# Patient Record
Sex: MALE | Race: WHITE | Employment: OTHER | URBAN - METROPOLITAN AREA
[De-identification: names, ages, dates, MRNs, and addresses within clinical notes are randomized per-mention and may not be internally consistent; named-entity substitution may affect disease eponyms.]

---

## 2017-02-07 RX ORDER — HYDROCODONE BITARTRATE AND ACETAMINOPHEN 5; 325 MG/1; MG/1
1 TABLET ORAL EVERY 6 HOURS PRN
Qty: 120 TABLET | Refills: 0 | Status: SHIPPED | OUTPATIENT
Start: 2017-02-07 | End: 2017-03-15

## 2017-02-10 ENCOUNTER — HOSPITAL ENCOUNTER (OUTPATIENT)
Dept: PAIN MANAGEMENT | Age: 67
Discharge: HOME | End: 2017-02-10
Payer: OTHER GOVERNMENT

## 2017-03-09 ENCOUNTER — HOSPITAL ENCOUNTER (OUTPATIENT)
Dept: PAIN MANAGEMENT | Age: 67
Discharge: HOME | End: 2017-03-09
Payer: COMMERCIAL

## 2017-03-15 ENCOUNTER — HOSPITAL ENCOUNTER (OUTPATIENT)
Dept: CARDIAC CATH/INVASIVE PROCEDURES | Age: 67
Discharge: HOME | End: 2017-03-15
Payer: COMMERCIAL

## 2017-03-15 VITALS
TEMPERATURE: 97.7 F | RESPIRATION RATE: 25 BRPM | OXYGEN SATURATION: 97 % | SYSTOLIC BLOOD PRESSURE: 107 MMHG | DIASTOLIC BLOOD PRESSURE: 64 MMHG | HEART RATE: 70 BPM | WEIGHT: 240 LBS | BODY MASS INDEX: 30.8 KG/M2 | HEIGHT: 74 IN

## 2017-03-15 DIAGNOSIS — I25.5 ISCHEMIC CARDIOMYOPATHY: Chronic | ICD-10-CM

## 2017-03-15 DIAGNOSIS — Z45.02 ICD (IMPLANTABLE CARDIOVERTER-DEFIBRILLATOR) BATTERY DEPLETION: ICD-10-CM

## 2017-03-15 LAB
GLUCOSE BLD-MCNC: 109 MG/DL (ref 74–106)
PLATELET # BLD: 213 K/UL (ref 140–450)
POC BUN: 21 MG/DL (ref 6–20)
POC CHLORIDE: 103 MMOL/L (ref 98–110)
POC CREATININE: 0.9 MG/DL (ref 0.6–1.4)
POC HEMATOCRIT: 46 % (ref 41–53)
POC HEMOGLOBIN: 15.6 GM/DL (ref 13.5–17.5)
POC POTASSIUM: 4.4 MMOL/L (ref 3.5–5.1)
POC SODIUM: 142 MMOL/L (ref 136–145)

## 2017-03-15 PROCEDURE — 6370000000 HC RX 637 (ALT 250 FOR IP)

## 2017-03-15 PROCEDURE — C1882 AICD, OTHER THAN SING/DUAL: HCPCS

## 2017-03-15 PROCEDURE — 2580000003 HC RX 258

## 2017-03-15 PROCEDURE — 7100000010 HC PHASE II RECOVERY - FIRST 15 MIN

## 2017-03-15 PROCEDURE — 93005 ELECTROCARDIOGRAM TRACING: CPT

## 2017-03-15 PROCEDURE — 85049 AUTOMATED PLATELET COUNT: CPT

## 2017-03-15 PROCEDURE — 6360000002 HC RX W HCPCS

## 2017-03-15 PROCEDURE — 82565 ASSAY OF CREATININE: CPT

## 2017-03-15 PROCEDURE — 82947 ASSAY GLUCOSE BLOOD QUANT: CPT

## 2017-03-15 PROCEDURE — 82435 ASSAY OF BLOOD CHLORIDE: CPT

## 2017-03-15 PROCEDURE — 33264 RMVL & RPLCMT DFB GEN MLT LD: CPT | Performed by: INTERNAL MEDICINE

## 2017-03-15 PROCEDURE — 7100000011 HC PHASE II RECOVERY - ADDTL 15 MIN

## 2017-03-15 PROCEDURE — 84520 ASSAY OF UREA NITROGEN: CPT

## 2017-03-15 PROCEDURE — 2500000003 HC RX 250 WO HCPCS

## 2017-03-15 PROCEDURE — 85014 HEMATOCRIT: CPT

## 2017-03-15 PROCEDURE — 84295 ASSAY OF SERUM SODIUM: CPT

## 2017-03-15 PROCEDURE — 84132 ASSAY OF SERUM POTASSIUM: CPT

## 2017-03-15 RX ORDER — HYDROCODONE BITARTRATE AND ACETAMINOPHEN 5; 325 MG/1; MG/1
2 TABLET ORAL EVERY 4 HOURS PRN
Status: DISCONTINUED | OUTPATIENT
Start: 2017-03-15 | End: 2017-03-16 | Stop reason: HOSPADM

## 2017-03-15 RX ORDER — ACETAMINOPHEN 325 MG/1
650 TABLET ORAL EVERY 4 HOURS PRN
Status: DISCONTINUED | OUTPATIENT
Start: 2017-03-15 | End: 2017-03-16 | Stop reason: HOSPADM

## 2017-03-15 RX ORDER — ONDANSETRON 2 MG/ML
4 INJECTION INTRAMUSCULAR; INTRAVENOUS EVERY 6 HOURS PRN
Status: DISCONTINUED | OUTPATIENT
Start: 2017-03-15 | End: 2017-03-16 | Stop reason: HOSPADM

## 2017-03-15 RX ORDER — SODIUM CHLORIDE 0.9 % (FLUSH) 0.9 %
10 SYRINGE (ML) INJECTION EVERY 12 HOURS SCHEDULED
Status: DISCONTINUED | OUTPATIENT
Start: 2017-03-15 | End: 2017-03-16 | Stop reason: HOSPADM

## 2017-03-15 RX ORDER — SODIUM CHLORIDE 0.9 % (FLUSH) 0.9 %
10 SYRINGE (ML) INJECTION PRN
Status: DISCONTINUED | OUTPATIENT
Start: 2017-03-15 | End: 2017-03-16 | Stop reason: HOSPADM

## 2017-03-15 RX ORDER — SODIUM CHLORIDE 9 MG/ML
INJECTION, SOLUTION INTRAVENOUS CONTINUOUS
Status: DISCONTINUED | OUTPATIENT
Start: 2017-03-15 | End: 2017-03-16 | Stop reason: HOSPADM

## 2017-03-15 RX ORDER — NORTRIPTYLINE HYDROCHLORIDE 10 MG/1
20 CAPSULE ORAL NIGHTLY
COMMUNITY

## 2017-03-15 RX ADMIN — SODIUM CHLORIDE: 9 INJECTION, SOLUTION INTRAVENOUS at 10:04

## 2017-03-16 LAB
EKG ATRIAL RATE: 70 BPM
EKG P-R INTERVAL: 120 MS
EKG Q-T INTERVAL: 482 MS
EKG QRS DURATION: 164 MS
EKG QTC CALCULATION (BAZETT): 520 MS
EKG R AXIS: -106 DEGREES
EKG T AXIS: -172 DEGREES
EKG VENTRICULAR RATE: 70 BPM

## 2017-06-20 ENCOUNTER — TELEPHONE (OUTPATIENT)
Dept: PODIATRY | Age: 67
End: 2017-06-20

## 2017-06-21 RX ORDER — TERBINAFINE HYDROCHLORIDE 250 MG/1
250 TABLET ORAL DAILY
Qty: 90 TABLET | Refills: 0 | Status: SHIPPED | OUTPATIENT
Start: 2017-06-21 | End: 2017-08-02

## 2017-06-21 RX ORDER — OXICONAZOLE NITRATE 10 MG/G
1 CREAM TOPICAL 2 TIMES DAILY
Qty: 90 G | Refills: 5 | Status: SHIPPED | OUTPATIENT
Start: 2017-06-21

## 2017-08-28 ENCOUNTER — TELEPHONE (OUTPATIENT)
Dept: ORTHOPEDIC SURGERY | Age: 67
End: 2017-08-28

## 2017-08-29 ENCOUNTER — OFFICE VISIT (OUTPATIENT)
Dept: ORTHOPEDIC SURGERY | Age: 67
End: 2017-08-29
Payer: COMMERCIAL

## 2017-08-29 DIAGNOSIS — M25.569 ACUTE KNEE PAIN, UNSPECIFIED LATERALITY: ICD-10-CM

## 2017-08-29 DIAGNOSIS — Z96.653 STATUS POST TOTAL BILATERAL KNEE REPLACEMENT: ICD-10-CM

## 2017-08-29 DIAGNOSIS — M17.10 ARTHRITIS OF KNEE: Primary | ICD-10-CM

## 2017-08-29 PROCEDURE — 99213 OFFICE O/P EST LOW 20 MIN: CPT | Performed by: ORTHOPAEDIC SURGERY

## 2017-08-29 PROCEDURE — 4004F PT TOBACCO SCREEN RCVD TLK: CPT | Performed by: ORTHOPAEDIC SURGERY

## 2017-08-29 PROCEDURE — 3017F COLORECTAL CA SCREEN DOC REV: CPT | Performed by: ORTHOPAEDIC SURGERY

## 2017-08-29 PROCEDURE — G8419 CALC BMI OUT NRM PARAM NOF/U: HCPCS | Performed by: ORTHOPAEDIC SURGERY

## 2017-08-29 PROCEDURE — G8598 ASA/ANTIPLAT THER USED: HCPCS | Performed by: ORTHOPAEDIC SURGERY

## 2017-08-29 PROCEDURE — G8428 CUR MEDS NOT DOCUMENT: HCPCS | Performed by: ORTHOPAEDIC SURGERY

## 2017-08-29 PROCEDURE — 1123F ACP DISCUSS/DSCN MKR DOCD: CPT | Performed by: ORTHOPAEDIC SURGERY

## 2017-08-29 PROCEDURE — 4040F PNEUMOC VAC/ADMIN/RCVD: CPT | Performed by: ORTHOPAEDIC SURGERY

## 2017-08-29 RX ORDER — SULFAMETHOXAZOLE AND TRIMETHOPRIM 800; 160 MG/1; MG/1
1 TABLET ORAL 2 TIMES DAILY
Qty: 20 TABLET | Refills: 0 | Status: SHIPPED | OUTPATIENT
Start: 2017-08-29 | End: 2017-09-08

## 2017-09-19 ENCOUNTER — OFFICE VISIT (OUTPATIENT)
Dept: ORTHOPEDIC SURGERY | Age: 67
End: 2017-09-19
Payer: COMMERCIAL

## 2017-09-19 DIAGNOSIS — M25.569 ACUTE KNEE PAIN, UNSPECIFIED LATERALITY: ICD-10-CM

## 2017-09-19 DIAGNOSIS — Z96.653 STATUS POST TOTAL BILATERAL KNEE REPLACEMENT: ICD-10-CM

## 2017-09-19 DIAGNOSIS — M17.10 ARTHRITIS OF KNEE: Primary | ICD-10-CM

## 2017-09-19 PROCEDURE — 99213 OFFICE O/P EST LOW 20 MIN: CPT | Performed by: ORTHOPAEDIC SURGERY

## 2017-09-19 PROCEDURE — 4004F PT TOBACCO SCREEN RCVD TLK: CPT | Performed by: ORTHOPAEDIC SURGERY

## 2017-09-19 PROCEDURE — G8421 BMI NOT CALCULATED: HCPCS | Performed by: ORTHOPAEDIC SURGERY

## 2017-09-19 PROCEDURE — G8598 ASA/ANTIPLAT THER USED: HCPCS | Performed by: ORTHOPAEDIC SURGERY

## 2017-09-19 PROCEDURE — 1123F ACP DISCUSS/DSCN MKR DOCD: CPT | Performed by: ORTHOPAEDIC SURGERY

## 2017-09-19 PROCEDURE — G8427 DOCREV CUR MEDS BY ELIG CLIN: HCPCS | Performed by: ORTHOPAEDIC SURGERY

## 2017-09-19 PROCEDURE — 3017F COLORECTAL CA SCREEN DOC REV: CPT | Performed by: ORTHOPAEDIC SURGERY

## 2017-09-19 PROCEDURE — 4040F PNEUMOC VAC/ADMIN/RCVD: CPT | Performed by: ORTHOPAEDIC SURGERY

## 2020-01-07 ENCOUNTER — OFFICE VISIT (OUTPATIENT)
Dept: ORTHOPEDIC SURGERY | Age: 70
End: 2020-01-07

## 2020-01-07 ENCOUNTER — TELEPHONE (OUTPATIENT)
Dept: ORTHOPEDIC SURGERY | Age: 70
End: 2020-01-07

## 2020-01-07 VITALS — BODY MASS INDEX: 30.8 KG/M2 | HEIGHT: 74 IN | WEIGHT: 240 LBS

## 2020-01-07 PROBLEM — M75.121 COMPLETE TEAR OF RIGHT ROTATOR CUFF: Status: ACTIVE | Noted: 2020-01-07

## 2020-01-07 PROCEDURE — 99203 OFFICE O/P NEW LOW 30 MIN: CPT | Performed by: ORTHOPAEDIC SURGERY

## 2020-01-07 NOTE — PROGRESS NOTES
includes Cervical spine surgery; AAA repair, endovascular (Left); vascular surgery; back surgery; Colonoscopy; Coronary angioplasty with stent (10/2007); Cardiac defibrillator placement (Left, 07/2015); pacemaker placement (02/2008); Knee arthroscopy (Left, 07/14/14); Knee Arthroplasty (Bilateral, 3/16/15); joint replacement (Bilateral, 03/2015); Cardiac defibrillator placement (03/15/2017); and Vasectomy. Current Medications  Current Outpatient Medications   Medication Sig Dispense Refill    oxiconazole nitrate (OXISTAT) 1 % CREA cream Apply 1 drop topically 2 times daily 90 g 5    Cholecalciferol (VITAMIN D PO) Take 3,000 Units by mouth daily      nortriptyline (PAMELOR) 10 MG capsule Take 20 mg by mouth nightly      NONFORMULARY Take 2 capsules by mouth daily OMEGA XL      GARCINIA CAMBOGIA-CHROMIUM PO Take 3 tablets by mouth daily      metoprolol (LOPRESSOR) 25 MG tablet Take 12.5 mg by mouth 2 times daily       clopidogrel (PLAVIX) 75 MG tablet       aspirin 81 MG chewable tablet Take 81 mg by mouth daily.  atorvastatin (LIPITOR) 80 MG tablet Take 80 mg by mouth daily. No current facility-administered medications for this visit. Allergies  Allergies have been reviewed. Calvin Nguyen has No Known Allergies. Social History  Calvin Nguyen  reports that he has quit smoking. His smoking use included cigarettes. He has a 11.25 pack-year smoking history. He uses smokeless tobacco. He reports current alcohol use. He reports that he does not use drugs. Family History  Viktor's family history includes Heart Disease in his father.      Physical Exam:     Ht 6' 2\" (1.88 m)   Wt 240 lb (108.9 kg)   BMI 30.81 kg/m²    General Appearance: alert, well appearing, and in no distress  Mental Status: alert, oriented to person, place, and time  Gait: normal    Shoulder:    Skin: warm and dry, no rash or erythema; no swelling or obvious muscular atrophy  Vasculature: 2+ radial pulses bilaterally  Neuro: Sensation glenohumeral joint space. Moderate acromioclavicular joint space narrowing. Type I acromion. No obvious fracture, dislocation, or subluxation. Right shoulder radiograph with moderate degenerative changes involving the acromioclavicular joint. Impression: Left shoulder radiograph with moderate degenerative changes involving the acromioclavicular joint. CT arthrogram of the right shoulder completed on 12/11/2019 was made available for review demonstrating what appears to be a full-thickness tear of the supraspinatus and possibly the infraspinatus with retraction to the mid humeral head. Impression/Plan:     Silas Bhakta is a 68 y.o. old male with bilateral shoulder pain. It appears that he has findings on the right side including imaging consistent with a full-thickness rotator cuff tear. He is undergone treatment with therapy as well as cortisone injections in the past.  At this time we discussed treatment options including continued conservative management in addition to surgical intervention by way of an arthroscopic rotator cuff repair. He is inclined to proceed with surgery consequently we discussed in detail what surgery would entail in terms of the actual procedure, risks and benefits of surgery, expected outcome and postoperative recovery course. Risks as discussed included but were not limited to risk of infection, wound healing problems, stiffness, progressive arthritis, persistent pain, re-tear of the rotator cuff, failure of the rotator cuff repair to heal, suture and/or anchor related problems, vascular injury, excessive bleeding, temporary and/or permanent nerve injury, medical risks including DVT, PE, and stroke, and risk of anesthesia. He demonstrated a good understanding of our discussion and at this time would like to proceed with surgical intervention as outlined above.   We will facilitate him getting cleared for surgery preoperatively and we'll schedule him for surgery at his convenience. All questions were appropriately answered however he was encouraged to return or call prior to surgery or any other upcoming appointments with additional questions and/or concerns. With regards to the left shoulder I believe him to have a rotator cuff and biceps tendinitis. We discussed treatment for this and I did recommend proceeding conservatively at this time. A course of physical therapy and a cortisone injection versus prescription anti-inflammatory would be recommended.   No treatment initiated at this time pending the right shoulder being taken care of.          NA = Not assessed  RTC = Rotator cuff  RCT = Rotator cuff tear  ER = External rotation  IR = Internal rotation  AC = Acromioclavicular  GH = Glenohumeral  n = No  y = Yes

## 2020-01-09 NOTE — TELEPHONE ENCOUNTER
I called the pricing line spoke with Chacorta Chopra and she stated she can not help me, I need to call speak with [facility] registration, she transferred me, the person whom answered the phone stated she was not able to assist me either since I do not have the patient's Medicare number, she told me to call the pricing line. I let her know I already did and they transferred me to her. I called back the pricing line and spoke again with Chacorta Chopra and she again stated she is not able to assist me, she stated let me check with someone else and then she hung up on me. I'm giving up for the evening, I will try again when I return on Tuesday.

## 2020-01-15 NOTE — TELEPHONE ENCOUNTER
Called the pricing line again today and spoke with Chacorta Chopra again, she was only able to give me a Self Pay estimate due to we do not have the patient's Medicare number. It's [id number]. I called [facility] and left a message for Keegan Chakraborty to call me back with an estimate cost for the anesthesia.

## 2020-02-12 ENCOUNTER — HOSPITAL ENCOUNTER (OUTPATIENT)
Dept: PREADMISSION TESTING | Age: 70
Discharge: HOME | End: 2020-02-16
Payer: OTHER GOVERNMENT

## 2020-02-12 VITALS
DIASTOLIC BLOOD PRESSURE: 65 MMHG | WEIGHT: 240 LBS | SYSTOLIC BLOOD PRESSURE: 115 MMHG | OXYGEN SATURATION: 99 % | HEART RATE: 76 BPM | RESPIRATION RATE: 16 BRPM | BODY MASS INDEX: 30.8 KG/M2 | TEMPERATURE: 97.7 F | HEIGHT: 74 IN

## 2020-02-12 LAB
ABSOLUTE EOS #: 0.2 K/UL (ref 0–0.4)
ABSOLUTE IMMATURE GRANULOCYTE: ABNORMAL K/UL (ref 0–0.3)
ABSOLUTE LYMPH #: 0.8 K/UL (ref 1–4.8)
ABSOLUTE MONO #: 0.3 K/UL (ref 0.1–1.3)
ANION GAP SERPL CALCULATED.3IONS-SCNC: 12 MMOL/L (ref 9–17)
BASOPHILS # BLD: 1 % (ref 0–2)
BASOPHILS ABSOLUTE: 0 K/UL (ref 0–0.2)
BUN BLDV-MCNC: 18 MG/DL (ref 8–23)
BUN/CREAT BLD: ABNORMAL (ref 9–20)
CALCIUM SERPL-MCNC: 9.1 MG/DL (ref 8.6–10.4)
CHLORIDE BLD-SCNC: 101 MMOL/L (ref 98–107)
CO2: 23 MMOL/L (ref 20–31)
CREAT SERPL-MCNC: 1.14 MG/DL (ref 0.7–1.2)
DIFFERENTIAL TYPE: ABNORMAL
EOSINOPHILS RELATIVE PERCENT: 4 % (ref 0–4)
GFR AFRICAN AMERICAN: >60 ML/MIN
GFR NON-AFRICAN AMERICAN: >60 ML/MIN
GFR SERPL CREATININE-BSD FRML MDRD: ABNORMAL ML/MIN/{1.73_M2}
GFR SERPL CREATININE-BSD FRML MDRD: ABNORMAL ML/MIN/{1.73_M2}
GLUCOSE BLD-MCNC: 145 MG/DL (ref 70–99)
HCT VFR BLD CALC: 40.1 % (ref 41–53)
HEMOGLOBIN: 13.6 G/DL (ref 13.5–17.5)
IMMATURE GRANULOCYTES: ABNORMAL %
LYMPHOCYTES # BLD: 17 % (ref 24–44)
MCH RBC QN AUTO: 33 PG (ref 26–34)
MCHC RBC AUTO-ENTMCNC: 33.8 G/DL (ref 31–37)
MCV RBC AUTO: 97.6 FL (ref 80–100)
MONOCYTES # BLD: 7 % (ref 1–7)
NRBC AUTOMATED: ABNORMAL PER 100 WBC
PDW BLD-RTO: 13 % (ref 11.5–14.9)
PLATELET # BLD: 171 K/UL (ref 150–450)
PLATELET ESTIMATE: ABNORMAL
PMV BLD AUTO: 7.2 FL (ref 6–12)
POTASSIUM SERPL-SCNC: 4.2 MMOL/L (ref 3.7–5.3)
RBC # BLD: 4.11 M/UL (ref 4.5–5.9)
RBC # BLD: ABNORMAL 10*6/UL
SEG NEUTROPHILS: 71 % (ref 36–66)
SEGMENTED NEUTROPHILS ABSOLUTE COUNT: 3.4 K/UL (ref 1.3–9.1)
SODIUM BLD-SCNC: 136 MMOL/L (ref 135–144)
WBC # BLD: 4.8 K/UL (ref 3.5–11)
WBC # BLD: ABNORMAL 10*3/UL

## 2020-02-12 PROCEDURE — 85025 COMPLETE CBC W/AUTO DIFF WBC: CPT

## 2020-02-12 PROCEDURE — 80048 BASIC METABOLIC PNL TOTAL CA: CPT

## 2020-02-12 PROCEDURE — 36415 COLL VENOUS BLD VENIPUNCTURE: CPT

## 2020-02-12 RX ORDER — M-VIT,TX,IRON,MINS/CALC/FOLIC 27MG-0.4MG
1 TABLET ORAL DAILY
COMMUNITY

## 2020-02-12 NOTE — H&P
HISTORY and [address] [id number]       NAME:  Tee Hatch  MRN: [id number]   YOB: 1950   Date: 2/12/2020   Age: 68 y.o. Gender: male       COMPLAINT AND PRESENT HISTORY:     Tee Hatch is 68 y.o.,  male, undergoing preadmission testing for right Shoulder rotator cuff tear, impingement. Patient injured the right Shoulder 3 years   No recent falls or trauma. No redness, swelling or rashes. Pt C/O of pain, weakness, stiffness, popping/ cracking and limitation in the range of motion (Abduction to 50 Degrees). Pt denies any other symptoms.   No Hx of MRSA infections in the past.    PAST MEDICAL HISTORY     Past Medical History:   Diagnosis Date    Arthritis     CAD (coronary artery disease)     Chondromalacia of patella     Diverticulitis     Heart [facility] ([facility] [address]     History of cardiac arrest 10/2007    Hyperlipidemia     Hypertension     Ischemic cardiomyopathy     Knee effusion     Medial meniscus tear     Migraine-cluster headache syndrome     Tobacco abuse     Wears glasses        SURGICAL HISTORY       Past Surgical History:   Procedure Laterality Date    ABDOMINAL AORTIC ANEURYSM REPAIR, ENDOVASCULAR Left     BACK SURGERY      CARDIAC DEFIBRILLATOR PLACEMENT Left 07/2015    CHANGE OUT [employer] PROTECTA    CARDIAC DEFIBRILLATOR PLACEMENT  03/15/2017    REPLACEMENT  /  DR Oscar Guardian CERVICAL SPINE SURGERY      COLONOSCOPY      3-4X    CORONARY ANGIOPLASTY WITH STENT PLACEMENT  10/2007    PTCA AND STENT TO LAD    JOINT REPLACEMENT Bilateral 03/2015    KNEE ARTHROPLASTY Bilateral 3/16/15    KNEE ARTHROSCOPY Left 07/14/14    partial medial menisectomy    PACEMAKER PLACEMENT  02/2008    [employer]    VASCULAR SURGERY      AAA REPAIR    VASECTOMY         FAMILY HISTORY       Family History   Problem Relation Age of Onset    Heart Disease Father        SOCIAL HISTORY       Social History     Socioeconomic History    Marital status:  Spouse name: Not on file    Number of children: Not on file    Years of education: Not on file    Highest education level: Not on file   Occupational History    Occupation:    Social Needs    Financial resource strain: Not on file    Food insecurity:     Worry: Not on file     Inability: Not on file    Transportation needs:     Medical: Not on file     Non-medical: Not on file   Tobacco Use    Smoking status: Former Smoker     Packs/day: 0.25     Years: 45.00     Pack years: 11.25     Types: Cigarettes    Smokeless tobacco: Current User    Tobacco comment: E-CIGARRETS DAILY now.    Substance and Sexual Activity    Alcohol use: Yes     Comment: 1-2 COCKTAILS weekends    Drug use: No    Sexual activity: Not on file   Lifestyle    Physical activity:     Days per week: Not on file     Minutes per session: Not on file    Stress: Not on file   Relationships    Social connections:     Talks on phone: Not on file     Gets together: Not on file     Attends [employer] service: Not on file     Active member of club or organization: Not on file     Attends meetings of clubs or organizations: Not on file     Relationship status: Not on file    Intimate partner violence:     Fear of current or ex partner: Not on file     Emotionally abused: Not on file     Physically abused: Not on file     Forced sexual activity: Not on file   Other Topics Concern    Not on file   Social History Narrative    Not on file           REVIEW OF SYSTEMS      No Known Allergies    Current Outpatient Medications on File Prior to Encounter   Medication Sig Dispense Refill    oxiconazole nitrate (OXISTAT) 1 % CREA cream Apply 1 drop topically 2 times daily 90 g 5    Cholecalciferol (VITAMIN D PO) Take 3,000 Units by mouth daily      nortriptyline (PAMELOR) 10 MG capsule Take 20 mg by mouth nightly      NONFORMULARY Take 2 capsules by mouth daily OMEGA XL      GARCINIA CAMBOGIA-CHROMIUM PO Take 3 tablets by mouth daily      ulcerations. NEUROLOGIC:  The patient is conscious, alert, oriented,Cranial nerve II-XII intact, taste and smell were not examined. No apparent focal sensory or motor deficits. PROVISIONAL DIAGNOSES / SURGERY:      Rt Shoulder syndrome, Rotator cuff Tear. Rt shoulder Arthroscopic Rotator cuff Repair.     Patient Active Problem List    Diagnosis Date Noted    ICD (implantable cardioverter-defibrillator) battery depletion 03/15/2017     Priority: High    Ischemic cardiomyopathy 03/15/2017     Priority: High    Complete tear of right rotator cuff 01/07/2020    Encounter for medication management     Status post total bilateral knee replacement     Chronic pain of both knees     Encounter for pain management planning     S/P total knee replacement 03/24/2015    Acute blood loss anemia 03/18/2015    Arthritis of knee 06/27/2013    Medial meniscus tear 06/27/2013    Knee effusion 06/27/2013    Knee pain 06/27/2013    Chondromalacia of patella 06/27/2013           BARNEY LERNER PA-C on 2/12/2020 at 4:26 PM

## 2020-02-13 ENCOUNTER — TELEPHONE (OUTPATIENT)
Dept: ORTHOPEDIC SURGERY | Age: 70
End: 2020-02-13

## 2020-02-13 RX ORDER — SODIUM CHLORIDE 0.9 % (FLUSH) 0.9 %
10 SYRINGE (ML) INJECTION EVERY 12 HOURS SCHEDULED
Status: CANCELLED | OUTPATIENT
Start: 2020-02-13

## 2020-02-13 RX ORDER — SODIUM CHLORIDE, SODIUM LACTATE, POTASSIUM CHLORIDE, CALCIUM CHLORIDE 600; 310; 30; 20 MG/100ML; MG/100ML; MG/100ML; MG/100ML
INJECTION, SOLUTION INTRAVENOUS CONTINUOUS
Status: CANCELLED | OUTPATIENT
Start: 2020-02-13

## 2020-02-13 RX ORDER — LIDOCAINE HYDROCHLORIDE 10 MG/ML
1 INJECTION, SOLUTION EPIDURAL; INFILTRATION; INTRACAUDAL; PERINEURAL
Status: CANCELLED | OUTPATIENT
Start: 2020-02-13 | End: 2020-02-13

## 2020-02-13 RX ORDER — SODIUM CHLORIDE 0.9 % (FLUSH) 0.9 %
10 SYRINGE (ML) INJECTION PRN
Status: CANCELLED | OUTPATIENT
Start: 2020-02-13

## 2020-02-13 NOTE — TELEPHONE ENCOUNTER
Spoke with Sheyla Reyes. The charge for his appointment on 1-7-20 can't be billed to the [place] because we are not [place] providers. He may come in next week and pay for the 1-7-20 visit since he can get the 40% discount. He said his surgery was approved by the [place] but he was only told this through a phone call. He doesn't have anything in writing. When he gets something in writing he will bring it to the office.

## 2020-02-14 ENCOUNTER — TELEPHONE (OUTPATIENT)
Dept: ORTHOPEDIC SURGERY | Age: 70
End: 2020-02-14

## 2020-02-14 NOTE — TELEPHONE ENCOUNTER
Dayne Selby from [facility] benefits dept called in regards to patient Radha Cam. She is requesting that someone from Dr. Helena Miranda office either fax (or update in Epic) the referral for surgery from the [facility]. That fax number is: [phone]. If you need to call Dayne Selby back directly, she can be reached at: [phone]. Thank you.

## 2020-02-18 ENCOUNTER — TELEPHONE (OUTPATIENT)
Dept: ORTHOPEDIC SURGERY | Age: 70
End: 2020-02-18

## 2020-02-18 NOTE — TELEPHONE ENCOUNTER
Called [phone] and spoke with Nhung Hammond ([id number]) the [address] for Clearence Bobby. She is going to look into getting the medical and cardiac clearance for Clearence Bobby. They also looked into the approval for surgery and apparently it was just tagged to the person who does the approvals today. They recommended we give them a few days then call.       Sage Burns.

## 2020-02-21 ENCOUNTER — OFFICE VISIT (OUTPATIENT)
Dept: ORTHOPEDIC SURGERY | Age: 70
End: 2020-02-21
Payer: OTHER GOVERNMENT

## 2020-02-21 VITALS — WEIGHT: 240 LBS | HEIGHT: 74 IN | BODY MASS INDEX: 30.8 KG/M2

## 2020-02-21 PROCEDURE — 99212 OFFICE O/P EST SF 10 MIN: CPT | Performed by: ORTHOPAEDIC SURGERY

## 2020-02-21 RX ORDER — GABAPENTIN 300 MG/1
CAPSULE ORAL
Qty: 7 CAPSULE | Refills: 0 | Status: CANCELLED | OUTPATIENT
Start: 2020-02-21 | End: 2020-02-28

## 2020-02-21 RX ORDER — HYDROCODONE BITARTRATE AND ACETAMINOPHEN 5; 325 MG/1; MG/1
1 TABLET ORAL EVERY 4 HOURS
Qty: 42 TABLET | Refills: 0 | Status: CANCELLED | OUTPATIENT
Start: 2020-02-21 | End: 2020-02-28

## 2020-02-21 RX ORDER — ONDANSETRON 4 MG/1
4 TABLET, FILM COATED ORAL DAILY PRN
Qty: 20 TABLET | Refills: 0 | Status: CANCELLED | OUTPATIENT
Start: 2020-02-21

## 2020-02-22 NOTE — PROGRESS NOTES
HPI: Mr. Pam Echevarria is a 68-year-old with right shoulder rotator cuff tear. He is undergone treatment in the past with therapy and cortisone injections with persistent pain and shoulder dysfunction. Consequently he is elected to pursue surgical intervention at this time by way of an arthroscopic rotator cuff repair. We once again discussed in detail what surgery would entail in terms of the procedure, risks and benefits of surgery, postoperative recovery course and anticipated outcome. All questions were appropriately answered. At this time we are still awaiting appropriate cardiology clearance and permission from the [place] to be able to go ahead with me performing the surgery. We will proceed with surgery once we get appropriate clearance and authorization.